# Patient Record
Sex: FEMALE | Race: WHITE | NOT HISPANIC OR LATINO | Employment: FULL TIME | URBAN - NONMETROPOLITAN AREA
[De-identification: names, ages, dates, MRNs, and addresses within clinical notes are randomized per-mention and may not be internally consistent; named-entity substitution may affect disease eponyms.]

---

## 2023-11-07 ENCOUNTER — APPOINTMENT (OUTPATIENT)
Dept: PRIMARY CARE | Facility: CLINIC | Age: 33
End: 2023-11-07
Payer: COMMERCIAL

## 2023-11-08 ENCOUNTER — APPOINTMENT (OUTPATIENT)
Dept: PRIMARY CARE | Facility: CLINIC | Age: 33
End: 2023-11-08
Payer: COMMERCIAL

## 2023-11-09 ENCOUNTER — OFFICE VISIT (OUTPATIENT)
Dept: PRIMARY CARE | Facility: CLINIC | Age: 33
End: 2023-11-09
Payer: COMMERCIAL

## 2023-11-09 VITALS
WEIGHT: 244.8 LBS | BODY MASS INDEX: 43.38 KG/M2 | HEART RATE: 96 BPM | TEMPERATURE: 97.5 F | HEIGHT: 63 IN | SYSTOLIC BLOOD PRESSURE: 122 MMHG | DIASTOLIC BLOOD PRESSURE: 85 MMHG

## 2023-11-09 DIAGNOSIS — R35.0 INCREASED URINARY FREQUENCY: ICD-10-CM

## 2023-11-09 DIAGNOSIS — R30.0 DYSURIA: Primary | ICD-10-CM

## 2023-11-09 LAB
POC APPEARANCE, URINE: CLEAR
POC BILIRUBIN, URINE: NEGATIVE
POC BLOOD, URINE: ABNORMAL
POC COLOR, URINE: YELLOW
POC GLUCOSE, URINE: NEGATIVE MG/DL
POC KETONES, URINE: NEGATIVE MG/DL
POC LEUKOCYTES, URINE: NEGATIVE
POC NITRITE,URINE: NEGATIVE
POC PH, URINE: 7 PH
POC PROTEIN, URINE: ABNORMAL MG/DL
POC SPECIFIC GRAVITY, URINE: 1.02
POC UROBILINOGEN, URINE: 2 EU/DL

## 2023-11-09 PROCEDURE — 81003 URINALYSIS AUTO W/O SCOPE: CPT | Performed by: PHYSICIAN ASSISTANT

## 2023-11-09 PROCEDURE — 87086 URINE CULTURE/COLONY COUNT: CPT

## 2023-11-09 PROCEDURE — 99214 OFFICE O/P EST MOD 30 MIN: CPT | Performed by: PHYSICIAN ASSISTANT

## 2023-11-09 RX ORDER — SULFAMETHOXAZOLE AND TRIMETHOPRIM 800; 160 MG/1; MG/1
1 TABLET ORAL 2 TIMES DAILY
Qty: 14 TABLET | Refills: 0 | Status: SHIPPED | OUTPATIENT
Start: 2023-11-09 | End: 2023-11-16

## 2023-11-09 ASSESSMENT — ENCOUNTER SYMPTOMS
FREQUENCY: 1
CHILLS: 0
DYSURIA: 1

## 2023-11-09 ASSESSMENT — PATIENT HEALTH QUESTIONNAIRE - PHQ9
1. LITTLE INTEREST OR PLEASURE IN DOING THINGS: NOT AT ALL
SUM OF ALL RESPONSES TO PHQ9 QUESTIONS 1 AND 2: 0
2. FEELING DOWN, DEPRESSED OR HOPELESS: NOT AT ALL

## 2023-11-09 NOTE — PROGRESS NOTES
"Subjective   Patient ID: Namrata Palmer is a 33 y.o. female who presents for UTI (Patient having frequency with urination and right side discomfort x 3 days.).  HPI  Patient presents for evaluation of dysuria.  Patient reports several days of increased urinary frequency and recent onset of right-sided flank pain.  No fever, chills, nausea, vomiting, or other constitutional signs and symptoms.    Patient reports a history of overactive bladder diagnosis.  Patient states this was some years ago.  Patient was trialed on a medicine that was too effective and inhibited urination so this was stopped.  Patient has not seen urology for this in more than 5 years.    Review of Systems    Constitutional:  See HPI   Gastrointestinal: See HPI  Genitourinary: See HPI  Neurologic:  Alert and oriented X4, No numbness, No tingling.    All other systems are negative     Objective     /85   Pulse 96   Temp 36.4 °C (97.5 °F) (Temporal)   Ht 1.6 m (5' 3\")   Wt 111 kg (244 lb 12.8 oz)   BMI 43.36 kg/m²     Physical Exam    General:  Alert and oriented, No acute distress.    Eye:  Pupils are equal, round and reactive to light, Normal conjunctiva.    HENT:  Normocephalic,   Neck:  Supple    Respiratory: Respirations are non-labored   Musculoskeletal: Normal ROM and strength  Abdomen: Negative CVA tenderness bilateral  Integumentary:  Warm, Dry, Intact, No pallor, No rash.    Neurologic:  Alert, Oriented, Normal sensory, Cranial Nerves II-XII are grossly intact  Psychiatric:  Cooperative, Appropriate mood & affect.    Assessment/Plan   Dysuria and increased urinary frequency: Urinalysis with trace blood and some protein.  No leukocyte esterase or nitrites.  Patient requesting treatment despite this.  Bactrim was called in.  Urine sent for culture.  Patient admits to having to empty her bladder 15-20 times a day.  Referral to urology was placed.  Further recommendations pending culture results.  Problem List Items Addressed This " Visit    None  Visit Diagnoses       Dysuria    -  Primary    Relevant Medications    sulfamethoxazole-trimethoprim (Bactrim DS) 800-160 mg tablet    Other Relevant Orders    POCT UA Automated manually resulted (Completed)    Urine Culture    Increased urinary frequency        Relevant Orders    Referral to Urology            Final diagnoses:   [R30.0] Dysuria   [R35.0] Increased urinary frequency

## 2023-11-10 LAB — BACTERIA UR CULT: NORMAL

## 2023-11-20 ENCOUNTER — OFFICE VISIT (OUTPATIENT)
Dept: UROLOGY | Facility: CLINIC | Age: 33
End: 2023-11-20
Payer: COMMERCIAL

## 2023-11-20 ENCOUNTER — APPOINTMENT (OUTPATIENT)
Dept: UROLOGY | Facility: CLINIC | Age: 33
End: 2023-11-20
Payer: COMMERCIAL

## 2023-11-20 VITALS
SYSTOLIC BLOOD PRESSURE: 144 MMHG | WEIGHT: 247 LBS | BODY MASS INDEX: 42.17 KG/M2 | DIASTOLIC BLOOD PRESSURE: 88 MMHG | HEART RATE: 85 BPM | HEIGHT: 64 IN

## 2023-11-20 DIAGNOSIS — R35.0 INCREASED URINARY FREQUENCY: Primary | ICD-10-CM

## 2023-11-20 PROCEDURE — 99204 OFFICE O/P NEW MOD 45 MIN: CPT | Performed by: STUDENT IN AN ORGANIZED HEALTH CARE EDUCATION/TRAINING PROGRAM

## 2023-11-20 NOTE — PROGRESS NOTES
Subjective   Patient ID: Namrata Palmer is a 33 y.o. female who presents for No chief complaint on file..  HPI  Patient presents to establish care with microhematuria and increased urinary frequency. Her symptoms worsen with physical activity. LUTs are chronic and mild. Denies urgency. Denies dysuria and hematuria. Nocturia x1. Caffeine does worsen LUTs. No medications for LUTs.    Past medical history includes IBS, OAB.     Patient is a current smoker since 3 years.     Review of Systems  All systems were reviewed. Anything negative was noted in the HPI.    Objective   Physical Exam  HENT:      Right Ear: External ear normal.      Mouth/Throat:      Pharynx: Oropharynx is clear.   Abdominal:      General: Abdomen is flat. Bowel sounds are normal.      Palpations: Abdomen is soft.       Past Medical History:   Diagnosis Date    Encounter for  delivery without indication     Delivery of pregnancy by  section    Encounter for gynecological examination (general) (routine) without abnormal findings     Pap test, as part of routine gynecological examination    Other conditions influencing health status     Menstruation       Past Surgical History:   Procedure Laterality Date    OTHER SURGICAL HISTORY  2020    Colonoscopy    OTHER SURGICAL HISTORY  08/10/2020    Intrauterine device placement    OTHER SURGICAL HISTORY  08/10/2020     section low transverse       Assessment/Plan   Diagnoses and all orders for this visit:  Increased urinary frequency  -     Referral to Urology    - Microhem/ LUTs: We had a very long and extensive discussion with the patient regarding the pathophysiology, differential diagnosis, risk factor, management, natural history, incidence and diagnostic work-up of the condition. We had another discussion with the patient regarding lifestyle modifications including low fluid intake after 5 PM, timed voiding every 2 hours, and decrease caffeine intake.     Plan:  Lifestyle  modifications  Renal US  Cystoscopy    Follow up: 3 weeks for cystoscopy    Scribed for Dr. Adalberto Ramirez by Samra Parker, medical scribe, on 11/20/23 at 3:00 PM

## 2023-11-27 ENCOUNTER — APPOINTMENT (OUTPATIENT)
Dept: UROLOGY | Facility: CLINIC | Age: 33
End: 2023-11-27
Payer: COMMERCIAL

## 2023-12-20 ENCOUNTER — HOSPITAL ENCOUNTER (OUTPATIENT)
Dept: RADIOLOGY | Facility: HOSPITAL | Age: 33
Discharge: HOME | End: 2023-12-20
Payer: COMMERCIAL

## 2023-12-20 DIAGNOSIS — R35.0 INCREASED URINARY FREQUENCY: ICD-10-CM

## 2023-12-20 PROCEDURE — 76770 US EXAM ABDO BACK WALL COMP: CPT

## 2023-12-20 PROCEDURE — 76775 US EXAM ABDO BACK WALL LIM: CPT | Performed by: RADIOLOGY

## 2024-01-02 NOTE — PROGRESS NOTES
Patient ID: Namrata Palmer is a 33 y.o. female.     Procedures   The patient was prepped using a Betadine solution. Lidocaine jelly was instilled into the urethra. The flexible cystoscope was sterilely inserted into the urethra and formal cystoscopy performed in a systematic fashion. . For detailed findings of the procedure, please see Dr. Ramirez remarks below  CIPRO 250MG POBID TIMES 3 DAYS GIVEN      RENAL US (12/21/2023) NO SONOGRAPHIC CORRELATE FOR SYMTOMS

## 2024-01-08 ENCOUNTER — PROCEDURE VISIT (OUTPATIENT)
Dept: UROLOGY | Facility: CLINIC | Age: 34
End: 2024-01-08
Payer: COMMERCIAL

## 2024-01-08 VITALS — BODY MASS INDEX: 41.66 KG/M2 | HEIGHT: 64 IN | WEIGHT: 244 LBS

## 2024-01-08 DIAGNOSIS — R31.29 OTHER MICROSCOPIC HEMATURIA: Primary | ICD-10-CM

## 2024-01-08 PROCEDURE — 52000 CYSTOURETHROSCOPY: CPT | Performed by: STUDENT IN AN ORGANIZED HEALTH CARE EDUCATION/TRAINING PROGRAM

## 2024-01-08 NOTE — PROGRESS NOTES
HPI:    Namrata Palmer is a 33 y.o. female who presents for cystoscopy today.    PROCEDURE NOTE:    PREOPERATIVE DIAGNOSIS:  Microhematuria and increased urinary frequency     POSTOPERATIVE DIAGNOSIS:  Same    OPERATION:  Flexible Cystourethroscopy      ANESTHESIA:  2%  lidocaine jelly    COMPLICATIONS:  None    EBL: Minimal    SPECIMEN:    DISPOSITION:  The patient was discharged home after the procedure, per routine.    INDICATIONS: :  Ms. Palmer is a 33 y.o. patient with a history of microhematuria and increased urinary frequency who presents today for Cystoscopy.     The indications, risks and benefits of this procedure were discussed with the patient, consent was obtained prior to the procedure, and to the best of my judgement the patient seemed to understand and agree to the procedure.    PROCEDURE:  The patient  was brought into the procedure suite and informed consent was reviewed and confirmed. Vital signs were obtained prior to the procedure: There were no vitals taken for this visit..   The patient was escorted onto the stretcher, placed supine and froglegged, prepped with betadine and draped in the usual standard surgical fashion.  Intraurethral 2% viscous lidocaine jelly was used for local analgesia.  A 16 [place] flexible cystourethroscope was inserted into the urethra.     Upon entering the bladder the entire bladder was surveyed in a 360 degree fashion.  The left and right ureteral orifices were in normal orthotopic position effluxing clear yellow urine, bilaterally.   There was no evidence of any bladder lesions, foreign objects, stones or evidence of any mucosal changes. The cystoscope was then retroflexed.  The bladder neck was then further examined without any evidence of lesions. The scope was then removed and in an antegrade fashion, the urethra and bladder were again resurveyed with no evidence of additional lesions.  The cystoscope was then fully removed.   The patient tolerated the procedure  well.  Vitals were stable after the procedure.  The patient was able to void and was discharged home.  Verbal and written Post procedure instructions were reviewed with the patient.    IMPRESSION:    Cystoscopy was unremarkable.     PLAN:    We discussed trialing Myerbertiq 25mg po one tablet daily for her urinary urgency.    Follow up in 2 months to assess response to medication.       Scribed for Dr. Balaji Ramirez by Aimee Arellano. I , Dr. Balaji Ramirez  have personally reviewed and agreed with the information entered by the Virtual Scribe.

## 2024-01-09 RX ORDER — CIPROFLOXACIN 250 MG/1
250 TABLET, FILM COATED ORAL 2 TIMES DAILY
Qty: 6 TABLET | Refills: 0 | Status: SHIPPED | OUTPATIENT
Start: 2024-01-09 | End: 2024-01-12

## 2024-03-05 DIAGNOSIS — R35.0 INCREASED URINARY FREQUENCY: Primary | ICD-10-CM

## 2024-03-05 RX ORDER — MIRABEGRON 25 MG/1
25 TABLET, FILM COATED, EXTENDED RELEASE ORAL DAILY
Qty: 90 TABLET | Refills: 3 | Status: SHIPPED | OUTPATIENT
Start: 2024-03-05 | End: 2024-04-29 | Stop reason: SDUPTHER

## 2024-03-21 ENCOUNTER — TELEPHONE (OUTPATIENT)
Dept: PRIMARY CARE | Facility: CLINIC | Age: 34
End: 2024-03-21
Payer: COMMERCIAL

## 2024-03-21 NOTE — TELEPHONE ENCOUNTER
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  Wanted to know if you would give her a note to wear tennis shoes to work.  Please advise

## 2024-03-21 NOTE — LETTER
March 21, 2024     Namrata Palmer    Patient: Namrata Palmer   YOB: 1990   Date of Visit: 3/21/2024       To whom it may concern,     Pt has chronic foot pain due to flat feet.     Pt finds her pain worsens with having to wear dress shoes which are generally less supportive.     Please allow her to wear tennis shoes instead to work.     This will allow her more support and is medically necessary.     Sincerely,     Malini Lomax, DO

## 2024-04-29 DIAGNOSIS — R35.0 INCREASED URINARY FREQUENCY: ICD-10-CM

## 2024-04-30 RX ORDER — MIRABEGRON 25 MG/1
25 TABLET, FILM COATED, EXTENDED RELEASE ORAL DAILY
Qty: 90 TABLET | Refills: 1 | Status: SHIPPED | OUTPATIENT
Start: 2024-04-30

## 2024-07-02 NOTE — PROGRESS NOTES
Patient ID: Namrata Palmer is a 33 y.o. female.    Procedures  The patient was prepped using a Betadine solution. Lidocaine jelly was instilled into the urethra. The flexible cystoscope was sterilely inserted into the urethra and formal cystoscopy performed in a systematic fashion. . For detailed findings of the procedure, please see Dr. Ramirez remarks below  CIPRO 250MG POBID TIMES 3 DAYS GIVEN    CT 5/7/2024  1. There is no clearly acute intra-abdominal or intrapelvic abnormality   identified to include free air, bowel obstruction, or focal inflammatory   change.     2. No evidence of nephroureterolithiasis or hydronephrosis.     3. There is hepatic steatosis and hepatosplenomegaly. Colonic diverticulosis.     4. Additional incidental findings, as described. No specific CT finding is   identified to explain symptoms of acute on chronic right upper quadrant and   right flank pain currently.

## 2024-07-08 ENCOUNTER — APPOINTMENT (OUTPATIENT)
Dept: UROLOGY | Facility: CLINIC | Age: 34
End: 2024-07-08
Payer: COMMERCIAL

## 2024-07-08 DIAGNOSIS — N32.81 OAB (OVERACTIVE BLADDER): Primary | ICD-10-CM

## 2024-07-08 PROCEDURE — 99214 OFFICE O/P EST MOD 30 MIN: CPT | Performed by: STUDENT IN AN ORGANIZED HEALTH CARE EDUCATION/TRAINING PROGRAM

## 2024-07-08 NOTE — PROGRESS NOTES
Subjective   Patient ID: Namrata Palmer is a 33 y.o. female who presents for No chief complaint on file..  HPI  Patient is 6 month follow up for Microhematuria and Urinary Frequency.  Patient had a normal Cystoscopy 1/8/2024. Ct  was done 5/7/2024 which showed   1. There is no clearly acute intra-abdominal or intrapelvic abnormality   identified to include free air, bowel obstruction, or focal inflammatory   change.     2. No evidence of nephroureterolithiasis or hydronephrosis.     3. There is hepatic steatosis and hepatosplenomegaly. Colonic diverticulosis.     4. Additional incidental findings, as described. No specific CT finding is   identified to explain symptoms of acute on chronic right upper quadrant and   right flank pain currently.   No Hematuria, No Dysuria. ...Nocturia x1....  Review of Systems    Objective   Physical Exam    Assessment/Plan            Arabella Mac MA 07/08/24 11:06 AM

## 2024-07-08 NOTE — PROGRESS NOTES
Subjective   Patient ID: Namrata Palmer is a 33 y.o. female    HPI  33 y.o. female who is 6 month follow up for Microhematuria and Urinary Frequency.  Patient had a normal Cystoscopy 2024. Ct  was done 2024 which showed   1. There is no clearly acute intra-abdominal or intrapelvic abnormality   identified to include free air, bowel obstruction, or focal inflammatory   change.     2. No evidence of nephroureterolithiasis or hydronephrosis.     3. There is hepatic steatosis and hepatosplenomegaly. Colonic diverticulosis.     4. Additional incidental findings, as described. No specific CT finding is   identified to explain symptoms of acute on chronic right upper quadrant and   right flank pain currently.   No Hematuria, No Dysuria. ...Nocturia x1....  Review of Systems           Review of Systems    All systems were reviewed. Anything negative was noted in the HPI.    Objective   Physical Exam    General: Well developed, well nourished, alert and cooperative, appears in no acute distress   Eyes: Non-injected conjunctiva, sclera clear, no proptosis   Cardiac: Extremities are warm and well perfused. No edema, cyanosis or pallor   Lungs: Breathing is easy, non-labored. Speaking in clear and complete sentences. Normal diaphragmatic movement   MSK: Ambulatory with steady gait, unassisted   Neuro: Alert and oriented to person, place, and time   Psych: Demonstrates good judgment and reason, without hallucinations, abnormal affect or abnormal behaviors   Skin: No obvious lesions, no rashes       No CVA tenderness bilaterally   No suprapubic pain or discomfort       Past Medical History:   Diagnosis Date    Encounter for  delivery without indication ([facility])     Delivery of pregnancy by  section    Encounter for gynecological examination (general) (routine) without abnormal findings     Pap test, as part of routine gynecological examination    Other conditions influencing health status     Menstruation          Past Surgical History:   Procedure Laterality Date    OTHER SURGICAL HISTORY  2020    Colonoscopy    OTHER SURGICAL HISTORY  08/10/2020    Intrauterine device placement    OTHER SURGICAL HISTORY  08/10/2020     section low transverse           Assessment/Plan     OAB well controlleg on Gemtesa 25mg  33 y.o. female who presents for the above condition, We had a very long and extensive discussion with the patient regarding the pathophysiology, differential diagnosis, risk factor, management, natural history, incidence and diagnostic work-up of the condition.      Plan:  - Keep Gemtesa 25mg daily  Fu in 1 year         2024    Scribe Attestation  By signing my name below, I, All Jeffries   attest that this documentation has been prepared under the direction and in the presence of Dr. Adalberto Ramirez

## 2024-09-03 DIAGNOSIS — Z30.41 ENCOUNTER FOR SURVEILLANCE OF CONTRACEPTIVE PILLS: Primary | ICD-10-CM

## 2024-09-03 RX ORDER — DROSPIRENONE AND ETHINYL ESTRADIOL 0.03MG-3MG
1 KIT ORAL DAILY
Qty: 28 TABLET | Refills: 0 | OUTPATIENT
Start: 2024-09-03

## 2024-09-03 RX ORDER — NORETHINDRONE 0.35 MG/1
1 TABLET ORAL DAILY
Qty: 28 TABLET | Refills: 1 | Status: SHIPPED | OUTPATIENT
Start: 2024-09-03 | End: 2025-09-03

## 2024-10-02 ENCOUNTER — APPOINTMENT (OUTPATIENT)
Dept: OBSTETRICS AND GYNECOLOGY | Facility: CLINIC | Age: 34
End: 2024-10-02
Payer: COMMERCIAL

## 2024-10-04 ENCOUNTER — TELEPHONE (OUTPATIENT)
Dept: OBSTETRICS AND GYNECOLOGY | Facility: CLINIC | Age: 34
End: 2024-10-04
Payer: COMMERCIAL

## 2024-10-04 DIAGNOSIS — Z30.41 ENCOUNTER FOR SURVEILLANCE OF CONTRACEPTIVE PILLS: ICD-10-CM

## 2024-10-04 RX ORDER — NORETHINDRONE 0.35 MG/1
1 TABLET ORAL DAILY
Qty: 28 TABLET | Refills: 12 | Status: SHIPPED | OUTPATIENT
Start: 2024-10-04 | End: 2025-10-04

## 2024-10-04 NOTE — TELEPHONE ENCOUNTER
Patient called and is requesting a refill of her birth control. She is scheduled for her yearly on 10/9/24 and she ran out of pills. She should start a new pack on Sunday.

## 2024-10-09 ENCOUNTER — APPOINTMENT (OUTPATIENT)
Dept: OBSTETRICS AND GYNECOLOGY | Facility: CLINIC | Age: 34
End: 2024-10-09
Payer: COMMERCIAL

## 2024-10-09 ENCOUNTER — TELEPHONE (OUTPATIENT)
Dept: OBSTETRICS AND GYNECOLOGY | Facility: CLINIC | Age: 34
End: 2024-10-09

## 2024-10-09 VITALS
BODY MASS INDEX: 39.61 KG/M2 | DIASTOLIC BLOOD PRESSURE: 80 MMHG | HEIGHT: 64 IN | SYSTOLIC BLOOD PRESSURE: 120 MMHG | WEIGHT: 232 LBS

## 2024-10-09 DIAGNOSIS — Z01.419 WOMEN'S ANNUAL ROUTINE GYNECOLOGICAL EXAMINATION: Primary | ICD-10-CM

## 2024-10-09 DIAGNOSIS — Z11.3 SCREENING EXAMINATION FOR STI: ICD-10-CM

## 2024-10-09 PROCEDURE — 87591 N.GONORRHOEAE DNA AMP PROB: CPT

## 2024-10-09 PROCEDURE — 87491 CHLMYD TRACH DNA AMP PROBE: CPT

## 2024-10-09 PROCEDURE — 99395 PREV VISIT EST AGE 18-39: CPT | Performed by: STUDENT IN AN ORGANIZED HEALTH CARE EDUCATION/TRAINING PROGRAM

## 2024-10-09 PROCEDURE — 3008F BODY MASS INDEX DOCD: CPT | Performed by: STUDENT IN AN ORGANIZED HEALTH CARE EDUCATION/TRAINING PROGRAM

## 2024-10-09 ASSESSMENT — PAIN SCALES - GENERAL: PAINLEVEL: 0-NO PAIN

## 2024-10-09 NOTE — PROGRESS NOTES
"Assessment/Plan:  Namrata Palmer is a 34 y.o. female who presents to clinic today to address the following issues:   1. Women's annual routine gynecological examination        2. Screening examination for STI  C. trachomatis / N. gonorrhoeae, Amplified    C. trachomatis / N. gonorrhoeae, Amplified        Routine gynecologic exam within normal limits.  Patient not due for Pap smear at this time.  Breast exam does reveal some thick white discharge we discussed red flag symptoms including lumps changes in breast shape or size brown or bloody discharge I discussed that white discharge can be normal.  Patient is interested in IUD placement will send for prior authorization.    Pap smear: 11/2022, cotest negative, due 11/2027  Mammogram: [place]  HIV Screen: neg previously  Hep C Screen: neg previously  DV: neg 10/2024  DEXA: [facility]    Problem List Items Addressed This Visit    None  Visit Diagnoses       Women's annual routine gynecological examination    -  Primary    Screening examination for STI        Relevant Orders    C. trachomatis / N. gonorrhoeae, Amplified            There are no Patient Instructions on file for this visit.    Follow up: for IUD placement    Return precautions discussed.  An After Visit Summary was given to the patient.  All questions were answered and patient in agreement with plan.    Subjective:  Namrata Palmer is a 34 y.o. female who presents to clinic today for Annual Exam (Discuss iud )      HPI:    Gynecologic History:  (Please complete obstetric history in the history tab)  - Do you have any menstrual concerns? No, although slight difference with pill switch  - Do you have any breast concerns? no    - Date of last pap smear: 11/2022  - Results of last pap smear, if known: cotest negative  - Personal history of prior abnormal pap smear?: no  No results found for: \"INTERP\"    - Date of last mammogram: n/a      Sexual history (provider to ask):  - Have you been sexually active within " "the past year? yes  - What are the genders of your sexual partner(s)? male  - Are you or your spouse/partner wanting to become pregnant or have children in the next year? no  - If no, are you currently using any method to prevent pregnancy:? oral contraceptives  - Do you have any questions or concerns about contraceptive access? yes , interested in an IUD    - Do you have a personal history of sexually transmitted infections (STI)?: no  - Have you ever been tested for HIV? yes  - Do you want comprehensive STI testing today? no    Domestic Violence Screening (Provider to ask):  Because violence is so common in many people's lives and because there is help available for those being abused, I now ask every patient about domestic violence:  - Within the past year have you been hit, slapped, kicked or otherwise physically hurt by someone? no  - Are you in a relationship with a person who threatens or physically hurts you? no  - Has anyone forced you to have sexual activities that made you feel uncomfortable? no      Review of Systems    Objective:  /80   Ht 1.626 m (5' 4\")   Wt 105 kg (232 lb)   LMP 10/02/2024   BMI 39.82 kg/m²     Physical Exam  Vitals and nursing note reviewed. Exam conducted with a chaperone present.   Constitutional:       General: She is not in acute distress.     Appearance: Normal appearance.   HENT:      Head: Normocephalic and atraumatic.      Mouth/Throat:      Mouth: Mucous membranes are moist.   Eyes:      General: No scleral icterus.        Right eye: No discharge.         Left eye: No discharge.      Extraocular Movements: Extraocular movements intact.      Conjunctiva/sclera: Conjunctivae normal.   Cardiovascular:      Rate and Rhythm: Normal rate and regular rhythm.   Pulmonary:      Effort: Pulmonary effort is normal. No respiratory distress.      Breath sounds: Normal breath sounds. No wheezing.   Chest:      Chest wall: No mass, lacerations or deformity.   Breasts:     Wiliam " Score is 5.      Breasts are symmetrical.      Right: Normal.      Left: Normal.      Comments: Thick white discharge from left nipple  Abdominal:      General: Abdomen is flat. There is no distension.      Palpations: Abdomen is soft.   Genitourinary:     General: Normal vulva.      Exam position: Supine.      Pubic Area: No rash or pubic lice.       Wiliam stage (genital): 5.      Vagina: Normal.      Cervix: Normal.      Uterus: Normal.       Adnexa: Right adnexa normal and left adnexa normal.   Lymphadenopathy:      Upper Body:      Right upper body: No supraclavicular, axillary or pectoral adenopathy.      Left upper body: No supraclavicular, axillary or pectoral adenopathy.   Skin:     General: Skin is warm and dry.   Neurological:      General: No focal deficit present.      Mental Status: She is alert and oriented to person, place, and time.   Psychiatric:         Attention and Perception: Attention normal.         Mood and Affect: Mood normal.         Speech: Speech normal.         Behavior: Behavior normal.         Cognition and Memory: Cognition and memory normal.         Judgment: Judgment normal.         I spent 15 minutes in total time for this visit including all related clinical activities before, during, and after the visit excluding other billable activities/procedure time.     Elisa Thomas MD

## 2024-10-10 LAB
C TRACH RRNA SPEC QL NAA+PROBE: NEGATIVE
N GONORRHOEA DNA SPEC QL PROBE+SIG AMP: NEGATIVE

## 2024-10-15 DIAGNOSIS — R35.0 INCREASED URINARY FREQUENCY: Primary | ICD-10-CM

## 2024-10-15 RX ORDER — MIRABEGRON 25 MG/1
25 TABLET, FILM COATED, EXTENDED RELEASE ORAL DAILY
Qty: 90 TABLET | Refills: 1 | Status: SHIPPED | OUTPATIENT
Start: 2024-10-15

## 2024-10-15 NOTE — TELEPHONE ENCOUNTER
Called patient and left message to call office to schedule liletta insert. Approval scanned into chart buy and bill

## 2024-12-04 ENCOUNTER — APPOINTMENT (OUTPATIENT)
Dept: OBSTETRICS AND GYNECOLOGY | Facility: CLINIC | Age: 34
End: 2024-12-04
Payer: COMMERCIAL

## 2024-12-04 VITALS — SYSTOLIC BLOOD PRESSURE: 114 MMHG | DIASTOLIC BLOOD PRESSURE: 74 MMHG | HEIGHT: 65 IN | BODY MASS INDEX: 38.61 KG/M2

## 2024-12-04 DIAGNOSIS — Z30.430 ENCOUNTER FOR IUD INSERTION: Primary | ICD-10-CM

## 2024-12-04 LAB — PREGNANCY TEST URINE, POC: NEGATIVE

## 2024-12-04 PROCEDURE — 87491 CHLMYD TRACH DNA AMP PROBE: CPT

## 2024-12-04 PROCEDURE — 87591 N.GONORRHOEAE DNA AMP PROB: CPT

## 2024-12-04 PROCEDURE — 58300 INSERT INTRAUTERINE DEVICE: CPT | Performed by: STUDENT IN AN ORGANIZED HEALTH CARE EDUCATION/TRAINING PROGRAM

## 2024-12-04 PROCEDURE — 81025 URINE PREGNANCY TEST: CPT | Performed by: STUDENT IN AN ORGANIZED HEALTH CARE EDUCATION/TRAINING PROGRAM

## 2024-12-04 ASSESSMENT — PAIN SCALES - GENERAL: PAINLEVEL_OUTOF10: 0-NO PAIN

## 2024-12-04 NOTE — PROGRESS NOTES
Patient ID: Namrata Palmer is a 34 y.o. female.    Insert IUD    Performed by: Elisa Thomas MD  Authorized by: Elisa Thomas MD    Procedure: IUD insertion    Consent obtained by patient, parent, or legal power of  - including discussion of procedure risks and benefits, patient questions answered, and patient education provided: yes    Pregnancy risk: reasonably certain the patient is not pregnant    Date/Time of Insertion:  12/4/2024 2:50 PM  Immediately prior to procedure a time out was called: yes    Pelvic exam performed: no    Speculum placed in vagina: yes    Cervix cleaned and prepped: yes    Tenaculum/Allis/Ring Forceps applied to cervix: yes    Anesthesia used: no    Uterus sound depth (cm):  8  Cervix manually dilated: no    IUD inserted without complications: yes    OSM: 1 each levonorgestrel (Liletta) IUD  Strings trimmed to (cm):  4  Patient tolerated procedure well: yes    Inserted with ultrasound guidance: no    Transvaginal sono confirmed fundal placement: no    Estimated blood loss (mL):  2  Intended removal date: 8 years        Elisa Thomas MD

## 2024-12-04 NOTE — PATIENT INSTRUCTIONS
IUD INSERTION    Your doctor, Elisa Thomas MD, placed your liletta IUD (intrauterine device) on 12/4/24.    -  A follow-up appointment should be scheduled after your next menstrual period (or after one month) to check on correct placement of the IUD and to discuss any concerns you may have.    -  After your IUD placement, you may swim, exercise, and use tampons as soon as you wish.    -  Avoid having sexual intercourse for the next 2 to 3 days.    -  The IUD works right away for birth control.    -  Feel for the strings of the IUD at the cervix (opening to the uterus) every month at the end of your menstrual period.  If you do not feel the strings, schedule an appointment to see your doctor as soon as possible.  Use another form of birth control until you have been examined.      You may experience these normal side effects after IUD insertion:         1.  Mild lower abdominal cramping.       2.  Bleeding, like your average menstrual period, for 24 to 48 hours.       3.  Spotting or bleeding between periods which may occur for up to 3 months.      Call the clinic if you experience any of the following symptoms:    Vaginal bleeding that is heavier than a menstrual period or completely soaks a   pad in less than one hour.  Severe lower abdominal pain.  Unusual or foul-smelling vaginal discharge.  Fever greater than 100.4 degrees Fahrenheit orally during the first week after IUD placement.  A missed menstrual period, or if you think you may be pregnant.      -  Continue to have a check-up every year to check for the strings and IUD position.    -  Your IUD will need to be replaced in 8 years.      Please call us at [phone] if you have any questions or concerns.

## 2024-12-04 NOTE — LETTER
December 4, 2024     Patient: Namrata Palmer   YOB: 1990   Date of Visit: 12/4/2024       To Whom It May Concern:    Namrata Palmer was seen in my clinic on 12/4/2024 at 2:20 pm. Please excuse Namrata for her absence from work on this day to make the appointment.    If you have any questions or concerns, please don't hesitate to call.         Sincerely,         Elisa Thomas MD        CC: No Recipients

## 2024-12-05 LAB
C TRACH RRNA SPEC QL NAA+PROBE: NEGATIVE
N GONORRHOEA DNA SPEC QL PROBE+SIG AMP: NEGATIVE

## 2025-01-22 ENCOUNTER — APPOINTMENT (OUTPATIENT)
Dept: OBSTETRICS AND GYNECOLOGY | Facility: CLINIC | Age: 35
End: 2025-01-22
Payer: COMMERCIAL

## 2025-01-29 ENCOUNTER — APPOINTMENT (OUTPATIENT)
Dept: OBSTETRICS AND GYNECOLOGY | Facility: CLINIC | Age: 35
End: 2025-01-29
Payer: COMMERCIAL